# Patient Record
Sex: FEMALE | Race: WHITE | NOT HISPANIC OR LATINO
[De-identification: names, ages, dates, MRNs, and addresses within clinical notes are randomized per-mention and may not be internally consistent; named-entity substitution may affect disease eponyms.]

---

## 2017-01-05 ENCOUNTER — APPOINTMENT (OUTPATIENT)
Dept: UROLOGY | Facility: CLINIC | Age: 82
End: 2017-01-05

## 2017-01-05 DIAGNOSIS — C67.9 MALIGNANT NEOPLASM OF BLADDER, UNSPECIFIED: ICD-10-CM

## 2017-01-05 DIAGNOSIS — R35.0 FREQUENCY OF MICTURITION: ICD-10-CM

## 2017-01-07 LAB
APPEARANCE: ABNORMAL
BACTERIA: ABNORMAL
BILIRUBIN URINE: NEGATIVE
BLOOD URINE: ABNORMAL
CALCIUM OXALATE CRYSTALS: ABNORMAL
COLOR: YELLOW
GLUCOSE QUALITATIVE U: NORMAL MG/DL
HYALINE CASTS: 0 /LPF
KETONES URINE: NEGATIVE
LEUKOCYTE ESTERASE URINE: ABNORMAL
MICROSCOPIC-UA: NORMAL
NITRITE URINE: NEGATIVE
PH URINE: 5.5
PROTEIN URINE: 30 MG/DL
RED BLOOD CELLS URINE: 89 /HPF
SPECIFIC GRAVITY URINE: 1.03
SQUAMOUS EPITHELIAL CELLS: 3 /HPF
UROBILINOGEN URINE: NORMAL MG/DL
WHITE BLOOD CELLS URINE: 108 /HPF

## 2017-01-09 LAB — CORE LAB FLUID CYTOLOGY: NORMAL

## 2017-04-20 ENCOUNTER — APPOINTMENT (OUTPATIENT)
Dept: OTOLARYNGOLOGY | Facility: CLINIC | Age: 82
End: 2017-04-20

## 2017-04-20 VITALS
SYSTOLIC BLOOD PRESSURE: 130 MMHG | WEIGHT: 158 LBS | HEART RATE: 84 BPM | DIASTOLIC BLOOD PRESSURE: 77 MMHG | HEIGHT: 65 IN | BODY MASS INDEX: 26.33 KG/M2

## 2017-04-20 DIAGNOSIS — H69.83 OTHER SPECIFIED DISORDERS OF EUSTACHIAN TUBE, BILATERAL: ICD-10-CM

## 2017-04-20 DIAGNOSIS — H61.23 IMPACTED CERUMEN, BILATERAL: ICD-10-CM

## 2018-08-18 ENCOUNTER — EMERGENCY (EMERGENCY)
Facility: HOSPITAL | Age: 83
LOS: 1 days | Discharge: HOME | End: 2018-08-18
Attending: EMERGENCY MEDICINE | Admitting: EMERGENCY MEDICINE
Payer: MEDICARE

## 2018-08-18 VITALS
OXYGEN SATURATION: 100 % | RESPIRATION RATE: 20 BRPM | SYSTOLIC BLOOD PRESSURE: 139 MMHG | HEART RATE: 103 BPM | TEMPERATURE: 99 F | DIASTOLIC BLOOD PRESSURE: 84 MMHG

## 2018-08-18 DIAGNOSIS — Z90.49 ACQUIRED ABSENCE OF OTHER SPECIFIED PARTS OF DIGESTIVE TRACT: Chronic | ICD-10-CM

## 2018-08-18 DIAGNOSIS — Z96.641 PRESENCE OF RIGHT ARTIFICIAL HIP JOINT: Chronic | ICD-10-CM

## 2018-08-18 LAB
ALBUMIN SERPL ELPH-MCNC: 3.6 G/DL — SIGNIFICANT CHANGE UP (ref 3.3–5)
ALP SERPL-CCNC: 65 U/L — SIGNIFICANT CHANGE UP (ref 40–120)
ALT FLD-CCNC: 21 U/L — SIGNIFICANT CHANGE UP (ref 4–33)
AST SERPL-CCNC: 19 U/L — SIGNIFICANT CHANGE UP (ref 4–32)
BASOPHILS # BLD AUTO: 0.04 K/UL — SIGNIFICANT CHANGE UP (ref 0–0.2)
BASOPHILS NFR BLD AUTO: 0.4 % — SIGNIFICANT CHANGE UP (ref 0–2)
BILIRUB SERPL-MCNC: 0.3 MG/DL — SIGNIFICANT CHANGE UP (ref 0.2–1.2)
BUN SERPL-MCNC: 14 MG/DL — SIGNIFICANT CHANGE UP (ref 7–23)
CALCIUM SERPL-MCNC: 9.4 MG/DL — SIGNIFICANT CHANGE UP (ref 8.4–10.5)
CHLORIDE SERPL-SCNC: 103 MMOL/L — SIGNIFICANT CHANGE UP (ref 98–107)
CO2 SERPL-SCNC: 23 MMOL/L — SIGNIFICANT CHANGE UP (ref 22–31)
CREAT SERPL-MCNC: 0.88 MG/DL — SIGNIFICANT CHANGE UP (ref 0.5–1.3)
EOSINOPHIL # BLD AUTO: 0.06 K/UL — SIGNIFICANT CHANGE UP (ref 0–0.5)
EOSINOPHIL NFR BLD AUTO: 0.6 % — SIGNIFICANT CHANGE UP (ref 0–6)
GLUCOSE SERPL-MCNC: 171 MG/DL — HIGH (ref 70–99)
HCT VFR BLD CALC: 41.9 % — SIGNIFICANT CHANGE UP (ref 34.5–45)
HGB BLD-MCNC: 13.8 G/DL — SIGNIFICANT CHANGE UP (ref 11.5–15.5)
IMM GRANULOCYTES # BLD AUTO: 0.07 # — SIGNIFICANT CHANGE UP
IMM GRANULOCYTES NFR BLD AUTO: 0.8 % — SIGNIFICANT CHANGE UP (ref 0–1.5)
LYMPHOCYTES # BLD AUTO: 2.09 K/UL — SIGNIFICANT CHANGE UP (ref 1–3.3)
LYMPHOCYTES # BLD AUTO: 22.6 % — SIGNIFICANT CHANGE UP (ref 13–44)
MAGNESIUM SERPL-MCNC: 2 MG/DL — SIGNIFICANT CHANGE UP (ref 1.6–2.6)
MCHC RBC-ENTMCNC: 31.3 PG — SIGNIFICANT CHANGE UP (ref 27–34)
MCHC RBC-ENTMCNC: 32.9 % — SIGNIFICANT CHANGE UP (ref 32–36)
MCV RBC AUTO: 95 FL — SIGNIFICANT CHANGE UP (ref 80–100)
MONOCYTES # BLD AUTO: 0.73 K/UL — SIGNIFICANT CHANGE UP (ref 0–0.9)
MONOCYTES NFR BLD AUTO: 7.9 % — SIGNIFICANT CHANGE UP (ref 2–14)
NEUTROPHILS # BLD AUTO: 6.27 K/UL — SIGNIFICANT CHANGE UP (ref 1.8–7.4)
NEUTROPHILS NFR BLD AUTO: 67.7 % — SIGNIFICANT CHANGE UP (ref 43–77)
NRBC # FLD: 0 — SIGNIFICANT CHANGE UP
PHOSPHATE SERPL-MCNC: 2.9 MG/DL — SIGNIFICANT CHANGE UP (ref 2.5–4.5)
PLATELET # BLD AUTO: 330 K/UL — SIGNIFICANT CHANGE UP (ref 150–400)
PMV BLD: 9.6 FL — SIGNIFICANT CHANGE UP (ref 7–13)
POTASSIUM SERPL-MCNC: 3.8 MMOL/L — SIGNIFICANT CHANGE UP (ref 3.5–5.3)
POTASSIUM SERPL-SCNC: 3.8 MMOL/L — SIGNIFICANT CHANGE UP (ref 3.5–5.3)
PROT SERPL-MCNC: 7.2 G/DL — SIGNIFICANT CHANGE UP (ref 6–8.3)
RBC # BLD: 4.41 M/UL — SIGNIFICANT CHANGE UP (ref 3.8–5.2)
RBC # FLD: 12.5 % — SIGNIFICANT CHANGE UP (ref 10.3–14.5)
SODIUM SERPL-SCNC: 141 MMOL/L — SIGNIFICANT CHANGE UP (ref 135–145)
TROPONIN T, HIGH SENSITIVITY: 19 NG/L — SIGNIFICANT CHANGE UP (ref ?–14)
TROPONIN T, HIGH SENSITIVITY: 23 NG/L — SIGNIFICANT CHANGE UP (ref ?–14)
TSH SERPL-MCNC: 0.5 UIU/ML — SIGNIFICANT CHANGE UP (ref 0.27–4.2)
WBC # BLD: 9.26 K/UL — SIGNIFICANT CHANGE UP (ref 3.8–10.5)
WBC # FLD AUTO: 9.26 K/UL — SIGNIFICANT CHANGE UP (ref 3.8–10.5)

## 2018-08-18 PROCEDURE — 93010 ELECTROCARDIOGRAM REPORT: CPT | Mod: 59

## 2018-08-18 PROCEDURE — 71046 X-RAY EXAM CHEST 2 VIEWS: CPT | Mod: 26

## 2018-08-18 PROCEDURE — 99218: CPT

## 2018-08-18 RX ORDER — SIMVASTATIN 20 MG/1
20 TABLET, FILM COATED ORAL DAILY
Qty: 0 | Refills: 0 | Status: DISCONTINUED | OUTPATIENT
Start: 2018-08-18 | End: 2018-08-22

## 2018-08-18 NOTE — MEDICAL STUDENT ADULT H&P (EDUCATION) - NS MD HP STUD ASPLAN PLAN FT
Will observe patient in CDU on a monitored bed to assess for additional episodes of tachycardia, and likely send her for an echocardiogram for further cardiac assessment.

## 2018-08-18 NOTE — ED PROVIDER NOTE - OBJECTIVE STATEMENT
84F hx of bladder r/s, hld, c/o of palpitations 84F hx of bladder r/s, hld on simvastatin 20mg qhs, c/o of palpitations lasting approx 20min, with home oximeter and bp cuff recording rate to 180, onset this morning after breakfast, without reoccurence, was seen at  center prior to arrival and ekg sinus with normal rate. No known hx of CAD, no fam hx of CAD, never smoker, no alcohol/caffeine use, no recent illness/fevers/chills/cough/ dysuria/ abd pain/ n/v/d,       no cardiologist but  sees Dr Ricky Veloz

## 2018-08-18 NOTE — ED CDU PROVIDER INITIAL DAY NOTE - PHYSICAL EXAMINATION
Dr Bravo  pt no distress appears comfortable. nontoxic. mmm. EOMI. no facial asymmetry. normal S1-S2 No resp distress. able to speak in full and clear sentences. no wheeze, rales or stridor. soft nontender abdomen. no  rebound. no guarding. no sign of trauma. no CVAT AOx3, no focal deficits. CN 2-12 grossly intact. nl gait. no meningeal signs. no pedal edema. no calf tenderness. normal pulses bilateral feet.

## 2018-08-18 NOTE — MEDICAL STUDENT ADULT H&P (EDUCATION) - NS MD HP STUD ROS GENERAL FT
Patient feels in her usual state of health, and is in no acute distress. She denies constitutional symptoms including weight change, fever, chills, and night sweats.

## 2018-08-18 NOTE — MEDICAL STUDENT ADULT H&P (EDUCATION) - NS MD HP STUD PE CONSITUTIONAL FT
Patient is awake, alert, feeling in her usual state of health, and resting comfortably in her stretcher in no acute distress

## 2018-08-18 NOTE — MEDICAL STUDENT ADULT H&P (EDUCATION) - NS MD HP STUD ASPLAN ASSES FT
Patient is an 84 year old female with no cardiac history who presented to the ED for evaluation of possible episode of transient palpitations, found to be tachycardic in the 180's by her  on home oximeter. Patient has been chest pain free with no other symptoms all day.

## 2018-08-18 NOTE — ED CDU PROVIDER INITIAL DAY NOTE - PROGRESS NOTE DETAILS
PT no distress, nontoxic. no cp or sob.  Endorsed to BAILEY Perry BAILEY Perry: pt NAD, VSS, no acute complaints. Pt had 10 second interval of tachycardia (HR in the 130's) after returning from the bathroom - pt was asymptomatic during episode. Pt resting comfortably. Pending echo in am.

## 2018-08-18 NOTE — ED ADULT TRIAGE NOTE - CHIEF COMPLAINT QUOTE
Patient c/o of having palpitations this morning and was feeling lightheaded.  Pt states her heart rate was in the 180's pt was seen by her PMD this morning and sent in for further evaluation.  Pt in triage denies chest pain sob or feeling lightheaded.

## 2018-08-18 NOTE — ED CDU PROVIDER INITIAL DAY NOTE - ATTENDING CONTRIBUTION TO CARE
Attending Statement: I have reviewed and agree with all pertinent clinical information, including history and physical exam and agree with treatment plan of the PA, except as noted.  see HPI/PE

## 2018-08-18 NOTE — MEDICAL STUDENT ADULT H&P (EDUCATION) - NS MD HP STUD HX OF PRESENT ILLNESS FT
Patient is an 84 year old female with history of hypercholesterolemia, glaucoma, insomnia, bladder cancer in remission, and chronic sinusitis who presented to the ED accompanied by her  for evaluation of palpitations. Patient states that at approximately 7AM today she experienced acute onset of what she describes as "gurgling" in her epigastrium, directly in the sub-xiphoid region that lasted 10-15 minutes and then resolved spontaneously. Her  took her pulse at the time with an oximeter and it read 180 bpm. Patient went to a local urgent care center and an EKG with basic labs were both normal, but she was told to come to the ED for further cardiac workup. Patient is an excellent historian and adamantly denies chest pain, dyspnea, nausea, vomiting, headache, weakness, numbness, paresthesias, abdominal pain, changes in bowel/urinary habits, syncope, vision changes, dysarthria, and changes in mental status. Of note, patient states she and her  were seen by their PMD on Monday for URI-like symptoms and she completed a Z-haris.

## 2018-08-18 NOTE — ED CDU PROVIDER INITIAL DAY NOTE - OBJECTIVE STATEMENT
Dr Bravo  85yo F hx of HLD, hx of bladder ca presented to ED an episode of palpitations at 7am. Pt was in bed, felt an epigastric "rumbling" measured . Denies feeling dizzy/lightheaded/chest pain/nausea/vomit/diaphorectic during the event. Lasted approximately 10 min. Went to an , had ekg/labs referred to ED. Since this am pt "feels fine" no palpitations. no chest pain. no recent illness. no urinary complaints. nonsmoker. no travel hx

## 2018-08-18 NOTE — ED PROVIDER NOTE - MEDICAL DECISION MAKING DETAILS
84F no significant cardiac hx- with paroxysmal tachycardia, with left axis deviation here, sinus and normal rate without ischemic changes, will eval tsh/ cmp/ cbc/ trops, and likely obs.

## 2018-08-18 NOTE — MEDICAL STUDENT ADULT H&P (EDUCATION) - NS MD HP STUD RESULTS LAB FT
H/H - 13.8/41.9  WBC - 9.26  Platelets - 330  Troponin @13:00 - 23  Troponin @15:15 - 19  Na - 141  K - 3.8  Cl - 103  CO2 - 23  BUN - 14  Cr - 0.88  Glucose - 171

## 2018-08-18 NOTE — ED ADULT NURSE NOTE - NSIMPLEMENTINTERV_GEN_ALL_ED
Implemented All Universal Safety Interventions:  [place] to call system. Call bell, personal items and telephone within reach. Instruct patient to call for assistance. Room bathroom lighting operational. Non-slip footwear when patient is off stretcher. Physically safe environment: no spills, clutter or unnecessary equipment. Stretcher in lowest position, wheels locked, appropriate side rails in place.

## 2018-08-18 NOTE — MEDICAL STUDENT ADULT H&P (EDUCATION) - NS MD HP STUD ROS ENMT FT
No changes in hearing. No rhinorrhea. No oral lesions. No sore throat. (+) Occasional cough that patient attributes to post-nasal drip from sinus congestion

## 2018-08-18 NOTE — ED ADULT NURSE NOTE - OBJECTIVE STATEMENT
Patient to room 18 for evaluation of palpitations. Patient evaluated by MD. IVL placed to right AC 20 gauge and labs drawn and sent. Pt is sinus tach @94bpm on the cardiac monitor. Pt is comfortable and waiting for results, further tests, results and disposition.   ISAURA Adams

## 2018-08-18 NOTE — MEDICAL STUDENT ADULT H&P (EDUCATION) - NS MD HP STUD PE VITALS FT
Temperature  Temp (F): 98.9 Degrees F  Temp (C) Temp (C): 37.2 Degrees C  Temp site Temp Site: oral    Heart Rate  Heart Rate Heart Rate (beats/min): 90 /min  Heart Rate Method: cardiac monitor    Noninvasive Blood Pressure  BP Systolic Systolic: 137 mm Hg  BP Diastolic Diastolic (mm Hg): 80 mm Hg    Respiratory/Pulse Oximetry  Respiration Rate (breaths/min) Respiration Rate (breaths/min): 18 /min  SpO2 (%) SpO2 (%): 94 %  O2 delivery Patient On: room air

## 2018-08-19 VITALS
RESPIRATION RATE: 16 BRPM | TEMPERATURE: 98 F | DIASTOLIC BLOOD PRESSURE: 83 MMHG | SYSTOLIC BLOOD PRESSURE: 149 MMHG | HEART RATE: 81 BPM | OXYGEN SATURATION: 95 %

## 2018-08-19 LAB — HBA1C BLD-MCNC: 5.5 % — SIGNIFICANT CHANGE UP (ref 4–5.6)

## 2018-08-19 PROCEDURE — 93306 TTE W/DOPPLER COMPLETE: CPT | Mod: 26

## 2018-08-19 PROCEDURE — 99217: CPT

## 2018-08-19 NOTE — ED CDU PROVIDER DISPOSITION NOTE - CLINICAL COURSE
84F c/o palpitations, epigastric pressure, noted  PTA. In Urgentcare had EKG NSR and sent to ED. No sx in ED but noted elev.  while ambulating. No hx of heart murmur or CAD. No sx overnight, normal labs, trop, EKG. S/p ECHO in CDU with sl. MR, will dc home with FU for PMD and Cardiology (Dr. REGINA Veloz). Pt. recently tx for H.pylori.

## 2018-08-19 NOTE — ED CDU PROVIDER SUBSEQUENT DAY NOTE - MEDICAL DECISION MAKING DETAILS
84 year old female with a PMHx of HLD pw epigastric palpitations and recorded HR of ~180 yesterday at rest.  Plan: echo

## 2018-08-19 NOTE — ED CDU PROVIDER SUBSEQUENT DAY NOTE - PROGRESS NOTE DETAILS
pt feeling well, no events on tele. ambulated without difficulty. Echo report given to patient to follow up with PMD and cardiology. Wants to go, all results given to patient, with return precautions given.

## 2018-08-19 NOTE — ED CDU PROVIDER SUBSEQUENT DAY NOTE - HISTORY
84 year old female with a PMHx of HLD pw epigastric palpitations and recorded HR of ~180 yesterday at rest. Pt was seen at an urgent care center had EKG/labs performed which were WNL and told to f.u in the ED for further evaluation. Pt has been asymptomatic. Had a 10 second episode of tachycardia in the 130's after going to bathroom. Pending echo.

## 2018-10-19 ENCOUNTER — NON-APPOINTMENT (OUTPATIENT)
Age: 83
End: 2018-10-19

## 2018-10-19 ENCOUNTER — APPOINTMENT (OUTPATIENT)
Dept: ELECTROPHYSIOLOGY | Facility: CLINIC | Age: 83
End: 2018-10-19
Payer: MEDICARE

## 2018-10-19 VITALS
WEIGHT: 153 LBS | SYSTOLIC BLOOD PRESSURE: 146 MMHG | DIASTOLIC BLOOD PRESSURE: 76 MMHG | HEIGHT: 65 IN | BODY MASS INDEX: 25.49 KG/M2 | OXYGEN SATURATION: 95 % | HEART RATE: 76 BPM

## 2018-10-19 DIAGNOSIS — R00.2 PALPITATIONS: ICD-10-CM

## 2018-10-19 PROCEDURE — 93000 ELECTROCARDIOGRAM COMPLETE: CPT

## 2018-10-19 PROCEDURE — 99204 OFFICE O/P NEW MOD 45 MIN: CPT

## 2018-10-19 RX ORDER — METOPROLOL SUCCINATE 25 MG/1
25 TABLET, EXTENDED RELEASE ORAL
Qty: 1 | Refills: 2 | Status: ACTIVE | COMMUNITY
Start: 2018-10-19

## 2019-08-20 ENCOUNTER — OUTPATIENT (OUTPATIENT)
Dept: OUTPATIENT SERVICES | Facility: HOSPITAL | Age: 84
LOS: 1 days | End: 2019-08-20
Payer: MEDICARE

## 2019-08-20 ENCOUNTER — APPOINTMENT (OUTPATIENT)
Dept: RADIOLOGY | Facility: IMAGING CENTER | Age: 84
End: 2019-08-20
Payer: MEDICARE

## 2019-08-20 DIAGNOSIS — Z96.641 PRESENCE OF RIGHT ARTIFICIAL HIP JOINT: Chronic | ICD-10-CM

## 2019-08-20 DIAGNOSIS — Z00.8 ENCOUNTER FOR OTHER GENERAL EXAMINATION: ICD-10-CM

## 2019-08-20 DIAGNOSIS — Z90.49 ACQUIRED ABSENCE OF OTHER SPECIFIED PARTS OF DIGESTIVE TRACT: Chronic | ICD-10-CM

## 2019-08-20 PROCEDURE — 73502 X-RAY EXAM HIP UNI 2-3 VIEWS: CPT

## 2019-08-20 PROCEDURE — 73502 X-RAY EXAM HIP UNI 2-3 VIEWS: CPT | Mod: 26,RT

## 2020-11-17 ENCOUNTER — APPOINTMENT (OUTPATIENT)
Dept: GASTROENTEROLOGY | Facility: CLINIC | Age: 85
End: 2020-11-17
Payer: MEDICARE

## 2020-11-17 ENCOUNTER — NON-APPOINTMENT (OUTPATIENT)
Age: 85
End: 2020-11-17

## 2020-11-17 PROCEDURE — 99443: CPT

## 2020-11-24 DIAGNOSIS — Z01.812 ENCOUNTER FOR PREPROCEDURAL LABORATORY EXAMINATION: ICD-10-CM

## 2020-11-24 DIAGNOSIS — K80.50 CALCULUS OF BILE DUCT W/OUT CHOLANGITIS OR CHOLECYSTITIS W/OUT OBSTRUCTION: ICD-10-CM

## 2020-12-14 ENCOUNTER — APPOINTMENT (OUTPATIENT)
Dept: MRI IMAGING | Facility: CLINIC | Age: 85
End: 2020-12-14

## 2021-01-12 ENCOUNTER — OUTPATIENT (OUTPATIENT)
Dept: OUTPATIENT SERVICES | Facility: HOSPITAL | Age: 86
LOS: 1 days | End: 2021-01-12
Payer: MEDICARE

## 2021-01-12 ENCOUNTER — APPOINTMENT (OUTPATIENT)
Dept: MRI IMAGING | Facility: CLINIC | Age: 86
End: 2021-01-12
Payer: MEDICARE

## 2021-01-12 DIAGNOSIS — Z90.49 ACQUIRED ABSENCE OF OTHER SPECIFIED PARTS OF DIGESTIVE TRACT: Chronic | ICD-10-CM

## 2021-01-12 DIAGNOSIS — Z00.8 ENCOUNTER FOR OTHER GENERAL EXAMINATION: ICD-10-CM

## 2021-01-12 DIAGNOSIS — K80.50 CALCULUS OF BILE DUCT WITHOUT CHOLANGITIS OR CHOLECYSTITIS WITHOUT OBSTRUCTION: ICD-10-CM

## 2021-01-12 DIAGNOSIS — Z96.641 PRESENCE OF RIGHT ARTIFICIAL HIP JOINT: Chronic | ICD-10-CM

## 2021-01-12 PROCEDURE — 74183 MRI ABD W/O CNTR FLWD CNTR: CPT | Mod: 26

## 2021-01-12 PROCEDURE — A9585: CPT

## 2021-01-12 PROCEDURE — 74183 MRI ABD W/O CNTR FLWD CNTR: CPT

## 2021-02-11 ENCOUNTER — OUTPATIENT (OUTPATIENT)
Dept: OUTPATIENT SERVICES | Facility: HOSPITAL | Age: 86
LOS: 1 days | End: 2021-02-11
Payer: MEDICARE

## 2021-02-11 VITALS
TEMPERATURE: 100 F | OXYGEN SATURATION: 96 % | DIASTOLIC BLOOD PRESSURE: 69 MMHG | RESPIRATION RATE: 20 BRPM | SYSTOLIC BLOOD PRESSURE: 111 MMHG | HEART RATE: 101 BPM | HEIGHT: 65 IN | WEIGHT: 141.1 LBS

## 2021-02-11 DIAGNOSIS — Z96.641 PRESENCE OF RIGHT ARTIFICIAL HIP JOINT: Chronic | ICD-10-CM

## 2021-02-11 DIAGNOSIS — Z90.49 ACQUIRED ABSENCE OF OTHER SPECIFIED PARTS OF DIGESTIVE TRACT: Chronic | ICD-10-CM

## 2021-02-11 DIAGNOSIS — Z01.818 ENCOUNTER FOR OTHER PREPROCEDURAL EXAMINATION: ICD-10-CM

## 2021-02-11 DIAGNOSIS — K80.20 CALCULUS OF GALLBLADDER WITHOUT CHOLECYSTITIS WITHOUT OBSTRUCTION: ICD-10-CM

## 2021-02-11 DIAGNOSIS — N39.0 URINARY TRACT INFECTION, SITE NOT SPECIFIED: ICD-10-CM

## 2021-02-11 DIAGNOSIS — K80.50 CALCULUS OF BILE DUCT WITHOUT CHOLANGITIS OR CHOLECYSTITIS WITHOUT OBSTRUCTION: ICD-10-CM

## 2021-02-11 DIAGNOSIS — Z87.898 PERSONAL HISTORY OF OTHER SPECIFIED CONDITIONS: ICD-10-CM

## 2021-02-11 LAB
ALBUMIN SERPL ELPH-MCNC: 3.8 G/DL — SIGNIFICANT CHANGE UP (ref 3.3–5)
ALP SERPL-CCNC: 680 U/L — HIGH (ref 40–120)
ALT FLD-CCNC: 95 U/L — HIGH (ref 10–45)
ANION GAP SERPL CALC-SCNC: 16 MMOL/L — SIGNIFICANT CHANGE UP (ref 5–17)
AST SERPL-CCNC: 79 U/L — HIGH (ref 10–40)
BILIRUB SERPL-MCNC: 1 MG/DL — SIGNIFICANT CHANGE UP (ref 0.2–1.2)
BUN SERPL-MCNC: 15 MG/DL — SIGNIFICANT CHANGE UP (ref 7–23)
CALCIUM SERPL-MCNC: 9.4 MG/DL — SIGNIFICANT CHANGE UP (ref 8.4–10.5)
CHLORIDE SERPL-SCNC: 102 MMOL/L — SIGNIFICANT CHANGE UP (ref 96–108)
CO2 SERPL-SCNC: 21 MMOL/L — LOW (ref 22–31)
CREAT SERPL-MCNC: 0.85 MG/DL — SIGNIFICANT CHANGE UP (ref 0.5–1.3)
GLUCOSE SERPL-MCNC: 102 MG/DL — HIGH (ref 70–99)
HCT VFR BLD CALC: 43.5 % — SIGNIFICANT CHANGE UP (ref 34.5–45)
HGB BLD-MCNC: 13.9 G/DL — SIGNIFICANT CHANGE UP (ref 11.5–15.5)
MCHC RBC-ENTMCNC: 30.3 PG — SIGNIFICANT CHANGE UP (ref 27–34)
MCHC RBC-ENTMCNC: 32 GM/DL — SIGNIFICANT CHANGE UP (ref 32–36)
MCV RBC AUTO: 94.8 FL — SIGNIFICANT CHANGE UP (ref 80–100)
NRBC # BLD: 0 /100 WBCS — SIGNIFICANT CHANGE UP (ref 0–0)
PLATELET # BLD AUTO: 321 K/UL — SIGNIFICANT CHANGE UP (ref 150–400)
POTASSIUM SERPL-MCNC: 4.1 MMOL/L — SIGNIFICANT CHANGE UP (ref 3.5–5.3)
POTASSIUM SERPL-SCNC: 4.1 MMOL/L — SIGNIFICANT CHANGE UP (ref 3.5–5.3)
PROT SERPL-MCNC: 7.1 G/DL — SIGNIFICANT CHANGE UP (ref 6–8.3)
RBC # BLD: 4.59 M/UL — SIGNIFICANT CHANGE UP (ref 3.8–5.2)
RBC # FLD: 14 % — SIGNIFICANT CHANGE UP (ref 10.3–14.5)
SODIUM SERPL-SCNC: 139 MMOL/L — SIGNIFICANT CHANGE UP (ref 135–145)
WBC # BLD: 7.67 K/UL — SIGNIFICANT CHANGE UP (ref 3.8–10.5)
WBC # FLD AUTO: 7.67 K/UL — SIGNIFICANT CHANGE UP (ref 3.8–10.5)

## 2021-02-11 PROCEDURE — 80053 COMPREHEN METABOLIC PANEL: CPT

## 2021-02-11 PROCEDURE — G0463: CPT

## 2021-02-11 PROCEDURE — 93010 ELECTROCARDIOGRAM REPORT: CPT

## 2021-02-11 PROCEDURE — 85027 COMPLETE CBC AUTOMATED: CPT

## 2021-02-11 PROCEDURE — 93005 ELECTROCARDIOGRAM TRACING: CPT

## 2021-02-11 NOTE — H&P PST ADULT - NSICDXPASTMEDICALHX_GEN_ALL_CORE_FT
PAST MEDICAL HISTORY:  Hyperlipemia     Insomnia     UTI (urinary tract infection)      PAST MEDICAL HISTORY:  Calculus of gallbladder     History of palpitations     Hyperlipemia     Hypertension     Insomnia     UTI (urinary tract infection) to start on Bactrim DS 2/11/2021 night

## 2021-02-11 NOTE — H&P PST ADULT - NSANTHOSAYNRD_GEN_A_CORE
No. [facility] screening performed.  STOP BANG Legend: 0-2 = LOW Risk; 3-4 = INTERMEDIATE Risk; 5-8 = HIGH Risk

## 2021-02-11 NOTE — H&P PST ADULT - HISTORY OF PRESENT ILLNESS
87 year old female with h/o palpitations required a trip to the ED (8/2018, on metoprolol, last echo 2018), HTN, hyperlipidemia, B12 deficiency, bladder cancer (many years ago), frequent UTI (currently been treated with Bactrim DS, which patient will start on 2/11/2021 night), presents to preadmission testing today for upcoming scheduled ERCP 2/16/2021.      Covid-19 swab on 2/13/2021 [facility] in [facility]

## 2021-02-11 NOTE — H&P PST ADULT - NSICDXPROBLEM_GEN_ALL_CORE_FT
PROBLEM DIAGNOSES  Problem: Calculus of gallbladder  Assessment and Plan: scheduled for ERCP  pre-procedure instruction given to both patient and her son Garrett  Advised to maintain social distancing, good handwashing and mask wearing  Patient to scheduled apt with pcp for evaluation prior to procedure on 2/16/2021    Problem: UTI (urinary tract infection)  Assessment and Plan: Instructed to continue antibiotic as prescribed     Problem: History of palpitations  Assessment and Plan: Instructed to continue medication as prescribed   twelve lead ekg obtain at pst (sinus rhythm with sinus arrhythmia

## 2021-02-11 NOTE — H&P PST ADULT - REASON FOR ADMISSION
"I had gallbladder surgery long time ago and the doctor didn't remove everything, had abdominal pain."

## 2021-02-13 ENCOUNTER — APPOINTMENT (OUTPATIENT)
Dept: DISASTER EMERGENCY | Facility: CLINIC | Age: 86
End: 2021-02-13

## 2021-02-14 NOTE — PRE PROCEDURE NOTE - PRE PROCEDURE EVALUATION
Attending Physician: Socorro                            Procedure: ERCP    Indication for Procedure: CBD stone  ________________________________________________________  PAST MEDICAL & SURGICAL HISTORY:  Calculus of gallbladder    History of palpitations    Hypertension    UTI (urinary tract infection)  to start on Bactrim DS 2/11/2021 night    Insomnia    Hyperlipemia    S/P cholecystectomy  2012    S/P hip replacement, right  2011      ALLERGIES:  amoxicillin (Rash)    HOME MEDICATIONS:  ALPRAZolam 0.25 mg oral tablet: 1 tab(s) orally 3 times a day, PRN  Bactrim  mg-160 mg oral tablet: 0.5 tab(s) orally once a day   metoprolol tartrate 25 mg oral tablet: 0.5 tablet orally twice a day   simvastatin 10 mg oral tablet: 1 tab(s) orally once a day (at bedtime)  Vitamin B12 1000 mcg/mL injectable solution: once a month     AICD/PPM: [ ] yes   [ ] no    PERTINENT LAB DATA:                      PHYSICAL EXAMINATION:    T(C): --  HR: --  BP: --  RR: --  SpO2: --    Constitutional: NAD  HEENT: PERRLA, EOMI,    Neck:  No JVD  Respiratory: CTAB/L  Cardiovascular: S1 and S2  Gastrointestinal: BS+, soft, NT/ND  Extremities: No peripheral edema  Neurological: A/O x 3, no focal deficits  Psychiatric: Normal mood, normal affect  Skin: No rashes    ASA Class: I [ ]  II [ ]  III [ ]  IV [ ]    COMMENTS:    The patient is a suitable candidate for the planned procedure unless box checked [ ]  No, explain:

## 2021-02-15 LAB — SARS-COV-2 N GENE NPH QL NAA+PROBE: NOT DETECTED

## 2021-02-16 ENCOUNTER — OUTPATIENT (OUTPATIENT)
Dept: OUTPATIENT SERVICES | Facility: HOSPITAL | Age: 86
LOS: 1 days | End: 2021-02-16
Payer: MEDICARE

## 2021-02-16 ENCOUNTER — APPOINTMENT (OUTPATIENT)
Dept: GASTROENTEROLOGY | Facility: HOSPITAL | Age: 86
End: 2021-02-16

## 2021-02-16 VITALS
SYSTOLIC BLOOD PRESSURE: 122 MMHG | HEART RATE: 80 BPM | RESPIRATION RATE: 18 BRPM | OXYGEN SATURATION: 96 % | DIASTOLIC BLOOD PRESSURE: 70 MMHG

## 2021-02-16 VITALS
OXYGEN SATURATION: 99 % | WEIGHT: 143.96 LBS | TEMPERATURE: 98 F | RESPIRATION RATE: 15 BRPM | DIASTOLIC BLOOD PRESSURE: 67 MMHG | HEART RATE: 103 BPM | SYSTOLIC BLOOD PRESSURE: 121 MMHG | HEIGHT: 65 IN

## 2021-02-16 DIAGNOSIS — Z90.49 ACQUIRED ABSENCE OF OTHER SPECIFIED PARTS OF DIGESTIVE TRACT: Chronic | ICD-10-CM

## 2021-02-16 DIAGNOSIS — K50.80 CROHN'S DISEASE OF BOTH SMALL AND LARGE INTESTINE WITHOUT COMPLICATIONS: ICD-10-CM

## 2021-02-16 DIAGNOSIS — Z96.641 PRESENCE OF RIGHT ARTIFICIAL HIP JOINT: Chronic | ICD-10-CM

## 2021-02-16 PROCEDURE — 74330 X-RAY BILE/PANC ENDOSCOPY: CPT

## 2021-02-16 PROCEDURE — 43274 ERCP DUCT STENT PLACEMENT: CPT

## 2021-02-16 PROCEDURE — 43274 ERCP DUCT STENT PLACEMENT: CPT | Mod: GC

## 2021-02-16 PROCEDURE — 74328 X-RAY BILE DUCT ENDOSCOPY: CPT | Mod: 26,GC

## 2021-02-16 PROCEDURE — 43265 ERCP LITHOTRIPSY CALCULI: CPT | Mod: GC

## 2021-02-16 PROCEDURE — C2617: CPT

## 2021-02-16 PROCEDURE — C1887: CPT

## 2021-02-16 PROCEDURE — 43265 ERCP LITHOTRIPSY CALCULI: CPT

## 2021-02-16 PROCEDURE — C1769: CPT

## 2021-02-16 RX ORDER — INDOMETHACIN 50 MG
100 CAPSULE ORAL ONCE
Refills: 0 | Status: COMPLETED | OUTPATIENT
Start: 2021-02-16 | End: 2021-02-16

## 2021-02-16 RX ORDER — SODIUM CHLORIDE 9 MG/ML
500 INJECTION, SOLUTION INTRAVENOUS
Refills: 0 | Status: COMPLETED | OUTPATIENT
Start: 2021-02-16 | End: 2021-02-16

## 2021-02-16 RX ADMIN — SODIUM CHLORIDE 30 MILLILITER(S): 9 INJECTION, SOLUTION INTRAVENOUS at 09:47

## 2021-02-16 RX ADMIN — Medication 100 MILLIGRAM(S): at 10:12

## 2021-02-16 NOTE — PRE-ANESTHESIA EVALUATION ADULT - NSANTHADDINFOFT_GEN_ALL_CORE
Patient aware that she could lose her loose tooth. Will use a tooth guard to protect the tooth during the entire procedure.

## 2021-02-16 NOTE — ASU PATIENT PROFILE, ADULT - PMH
Calculus of gallbladder    History of palpitations    Hyperlipemia    Hypertension    Insomnia    UTI (urinary tract infection)  to start on Bactrim DS 2/11/2021 night

## 2021-02-24 DIAGNOSIS — Z46.89 ENCOUNTER FOR FITTING AND ADJUSTMENT OF OTHER SPECIFIED DEVICES: ICD-10-CM

## 2021-02-27 PROBLEM — I10 ESSENTIAL (PRIMARY) HYPERTENSION: Chronic | Status: ACTIVE | Noted: 2021-02-11

## 2021-02-27 PROBLEM — Z87.898 PERSONAL HISTORY OF OTHER SPECIFIED CONDITIONS: Chronic | Status: ACTIVE | Noted: 2021-02-11

## 2021-02-27 PROBLEM — K80.20 CALCULUS OF GALLBLADDER WITHOUT CHOLECYSTITIS WITHOUT OBSTRUCTION: Chronic | Status: ACTIVE | Noted: 2021-02-11

## 2021-04-12 NOTE — ED ADULT NURSE NOTE - NSFALLRSKOUTCOME_ED_ALL_ED
----- Message from Ayla Hammertigistirene sent at 4/12/2021  7:09 AM EDT -----  Subject: Refill Request    QUESTIONS  Name of Medication? lisinopril-hydroCHLOROthiazide (PRINZIDE;ZESTORETIC)   20-25 MG per tablet  Patient-reported dosage and instructions? Take 1 tablet by mouth daily  How many days do you have left? 0  Preferred Pharmacy? [id number] phone number (if available)? [phone]  Additional Information for Provider? new delivery mail out service  ---------------------------------------------------------------------------  --------------  [address]  What is the best way for the office to contact you? OK to leave message on   voicemail  Preferred Call Back Phone Number?  [phone] Universal Safety Interventions

## 2021-05-11 ENCOUNTER — OUTPATIENT (OUTPATIENT)
Dept: OUTPATIENT SERVICES | Facility: HOSPITAL | Age: 86
LOS: 1 days | End: 2021-05-11
Payer: MEDICARE

## 2021-05-11 VITALS
OXYGEN SATURATION: 97 % | SYSTOLIC BLOOD PRESSURE: 127 MMHG | WEIGHT: 149.03 LBS | HEIGHT: 65 IN | TEMPERATURE: 98 F | RESPIRATION RATE: 20 BRPM | HEART RATE: 91 BPM | DIASTOLIC BLOOD PRESSURE: 83 MMHG

## 2021-05-11 DIAGNOSIS — Z01.818 ENCOUNTER FOR OTHER PREPROCEDURAL EXAMINATION: ICD-10-CM

## 2021-05-11 DIAGNOSIS — K83.8 OTHER SPECIFIED DISEASES OF BILIARY TRACT: ICD-10-CM

## 2021-05-11 DIAGNOSIS — Z98.890 OTHER SPECIFIED POSTPROCEDURAL STATES: Chronic | ICD-10-CM

## 2021-05-11 DIAGNOSIS — I10 ESSENTIAL (PRIMARY) HYPERTENSION: ICD-10-CM

## 2021-05-11 DIAGNOSIS — Z90.49 ACQUIRED ABSENCE OF OTHER SPECIFIED PARTS OF DIGESTIVE TRACT: Chronic | ICD-10-CM

## 2021-05-11 DIAGNOSIS — Z46.89 ENCOUNTER FOR FITTING AND ADJUSTMENT OF OTHER SPECIFIED DEVICES: ICD-10-CM

## 2021-05-11 DIAGNOSIS — Z96.641 PRESENCE OF RIGHT ARTIFICIAL HIP JOINT: Chronic | ICD-10-CM

## 2021-05-11 LAB
ALBUMIN SERPL ELPH-MCNC: 4 G/DL — SIGNIFICANT CHANGE UP (ref 3.3–5)
ALP SERPL-CCNC: 76 U/L — SIGNIFICANT CHANGE UP (ref 40–120)
ALT FLD-CCNC: 12 U/L — SIGNIFICANT CHANGE UP (ref 10–45)
ANION GAP SERPL CALC-SCNC: 15 MMOL/L — SIGNIFICANT CHANGE UP (ref 5–17)
AST SERPL-CCNC: 15 U/L — SIGNIFICANT CHANGE UP (ref 10–40)
BILIRUB SERPL-MCNC: 0.4 MG/DL — SIGNIFICANT CHANGE UP (ref 0.2–1.2)
BUN SERPL-MCNC: 15 MG/DL — SIGNIFICANT CHANGE UP (ref 7–23)
CALCIUM SERPL-MCNC: 9.1 MG/DL — SIGNIFICANT CHANGE UP (ref 8.4–10.5)
CHLORIDE SERPL-SCNC: 106 MMOL/L — SIGNIFICANT CHANGE UP (ref 96–108)
CO2 SERPL-SCNC: 21 MMOL/L — LOW (ref 22–31)
CREAT SERPL-MCNC: 0.89 MG/DL — SIGNIFICANT CHANGE UP (ref 0.5–1.3)
GLUCOSE SERPL-MCNC: 91 MG/DL — SIGNIFICANT CHANGE UP (ref 70–99)
HCT VFR BLD CALC: 43.4 % — SIGNIFICANT CHANGE UP (ref 34.5–45)
HGB BLD-MCNC: 14 G/DL — SIGNIFICANT CHANGE UP (ref 11.5–15.5)
MCHC RBC-ENTMCNC: 29.9 PG — SIGNIFICANT CHANGE UP (ref 27–34)
MCHC RBC-ENTMCNC: 32.3 GM/DL — SIGNIFICANT CHANGE UP (ref 32–36)
MCV RBC AUTO: 92.5 FL — SIGNIFICANT CHANGE UP (ref 80–100)
NRBC # BLD: 0 /100 WBCS — SIGNIFICANT CHANGE UP (ref 0–0)
PLATELET # BLD AUTO: 239 K/UL — SIGNIFICANT CHANGE UP (ref 150–400)
POTASSIUM SERPL-MCNC: 4 MMOL/L — SIGNIFICANT CHANGE UP (ref 3.5–5.3)
POTASSIUM SERPL-SCNC: 4 MMOL/L — SIGNIFICANT CHANGE UP (ref 3.5–5.3)
PROT SERPL-MCNC: 6.8 G/DL — SIGNIFICANT CHANGE UP (ref 6–8.3)
RBC # BLD: 4.69 M/UL — SIGNIFICANT CHANGE UP (ref 3.8–5.2)
RBC # FLD: 13.9 % — SIGNIFICANT CHANGE UP (ref 10.3–14.5)
SODIUM SERPL-SCNC: 142 MMOL/L — SIGNIFICANT CHANGE UP (ref 135–145)
WBC # BLD: 6.49 K/UL — SIGNIFICANT CHANGE UP (ref 3.8–10.5)
WBC # FLD AUTO: 6.49 K/UL — SIGNIFICANT CHANGE UP (ref 3.8–10.5)

## 2021-05-11 PROCEDURE — 80053 COMPREHEN METABOLIC PANEL: CPT

## 2021-05-11 PROCEDURE — 85027 COMPLETE CBC AUTOMATED: CPT

## 2021-05-11 PROCEDURE — G0463: CPT

## 2021-05-11 RX ORDER — AZTREONAM 2 G
1 VIAL (EA) INJECTION
Qty: 0 | Refills: 0 | DISCHARGE

## 2021-05-11 RX ORDER — SIMVASTATIN 20 MG/1
1 TABLET, FILM COATED ORAL
Qty: 0 | Refills: 0 | DISCHARGE

## 2021-05-11 RX ORDER — PREGABALIN 225 MG/1
0 CAPSULE ORAL
Qty: 0 | Refills: 0 | DISCHARGE

## 2021-05-11 NOTE — H&P PST ADULT - NSICDXPASTMEDICALHX_GEN_ALL_CORE_FT
PAST MEDICAL HISTORY:  Calculus of gallbladder     History of palpitations     Hyperlipemia not on meds    Hypertension     Insomnia     UTI (urinary tract infection) recurrent

## 2021-05-11 NOTE — H&P PST ADULT - HISTORY OF PRESENT ILLNESS
87 year old female with h/o palpitations required a trip to the ED (8/2018, on metoprolol, last echo 2018), HTN, hyperlipidemia, B12 deficiency, bladder cancer (many years ago), frequent UTI , with dilated CBD - s/p  ERCP 2/16/2021 s/p needle knife sphincterotomy, mechanical lithotripsy, balloon extraction, and  double pigtail stent placed for continued drainage and stone fragmentation. Now coming in for ERCP on 5/18/2021.       Denies Recent travel, Exposure or Covid symptoms  Covid vaccine - 2 doses   Covid test - 5/15/2021 87 year old female with h/o palpitations,  HTN, hyperlipidemia, B12 deficiency, bladder cancer (many years ago), frequent UTI , with dilated CBD - s/p  ERCP 2/16/2021 s/p needle knife sphincterotomy, mechanical lithotripsy, balloon extraction, and  double pigtail stent placed for continued drainage and stone fragmentation. Now coming in for ERCP on 5/18/2021.       Denies Recent travel, Exposure or Covid symptoms  Covid vaccine - 2 doses   Covid test - 5/15/2021

## 2021-05-11 NOTE — H&P PST ADULT - NSICDXPROBLEM_GEN_ALL_CORE_FT
PROBLEM DIAGNOSES  Problem: Common bile duct dilation  Assessment and Plan: ERCP    Problem: HTN (hypertension)  Assessment and Plan: Continue on antihypertensive medication

## 2021-05-11 NOTE — H&P PST ADULT - NSICDXPASTSURGICALHX_GEN_ALL_CORE_FT
PAST SURGICAL HISTORY:  S/P cholecystectomy 2012    S/P ERCP 2/2021    S/P hip replacement, right 2011

## 2021-05-14 NOTE — PRE PROCEDURE NOTE - PRE PROCEDURE EVALUATION
Attending Physician:   Socorro                         Procedure: ERCP    Indication for Procedure: stone and stent removal  ________________________________________________________  PAST MEDICAL & SURGICAL HISTORY:  Hyperlipemia  not on meds    Insomnia    UTI (urinary tract infection)  recurrent    Hypertension    History of palpitations    Calculus of gallbladder    S/P hip replacement, right  2011    S/P cholecystectomy  2012    S/P ERCP  2/2021    S/P cystoscopy  TURBT X 1      ALLERGIES:  amoxicillin (Rash)    HOME MEDICATIONS:  ALPRAZolam 0.25 mg oral tablet: 1 tab(s) orally 3 times a day, As Needed - for anxiety  metoprolol tartrate 25 mg oral tablet: 0.5 tablet orally twice a day   Vitamin C 1000 mg oral tablet: 1 tab(s) orally once a day    AICD/PPM: [ ] yes   [ ] no    PERTINENT LAB DATA:                      PHYSICAL EXAMINATION:    T(C): --  HR: --  BP: --  RR: --  SpO2: --    Constitutional: NAD  HEENT: PERRLA, EOMI,    Neck:  No JVD  Respiratory: CTAB/L  Cardiovascular: S1 and S2  Gastrointestinal: BS+, soft, NT/ND  Extremities: No peripheral edema  Neurological: A/O x 3, no focal deficits  Psychiatric: Normal mood, normal affect  Skin: No rashes    ASA Class: I [ ]  II [ ]  III [ ]  IV [ ]    COMMENTS:    The patient is a suitable candidate for the planned procedure unless box checked [ ]  No, explain:

## 2021-05-15 ENCOUNTER — APPOINTMENT (OUTPATIENT)
Dept: DISASTER EMERGENCY | Facility: CLINIC | Age: 86
End: 2021-05-15

## 2021-05-16 LAB — SARS-COV-2 N GENE NPH QL NAA+PROBE: NOT DETECTED

## 2021-05-18 ENCOUNTER — OUTPATIENT (OUTPATIENT)
Dept: OUTPATIENT SERVICES | Facility: HOSPITAL | Age: 86
LOS: 1 days | End: 2021-05-18
Payer: MEDICARE

## 2021-05-18 ENCOUNTER — APPOINTMENT (OUTPATIENT)
Dept: GASTROENTEROLOGY | Facility: HOSPITAL | Age: 86
End: 2021-05-18

## 2021-05-18 VITALS
RESPIRATION RATE: 17 BRPM | OXYGEN SATURATION: 96 % | DIASTOLIC BLOOD PRESSURE: 75 MMHG | HEART RATE: 73 BPM | SYSTOLIC BLOOD PRESSURE: 129 MMHG

## 2021-05-18 VITALS
DIASTOLIC BLOOD PRESSURE: 78 MMHG | TEMPERATURE: 98 F | WEIGHT: 149.91 LBS | HEART RATE: 80 BPM | RESPIRATION RATE: 19 BRPM | HEIGHT: 65 IN | OXYGEN SATURATION: 98 % | SYSTOLIC BLOOD PRESSURE: 136 MMHG

## 2021-05-18 DIAGNOSIS — Z46.89 ENCOUNTER FOR FITTING AND ADJUSTMENT OF OTHER SPECIFIED DEVICES: ICD-10-CM

## 2021-05-18 DIAGNOSIS — Z98.890 OTHER SPECIFIED POSTPROCEDURAL STATES: Chronic | ICD-10-CM

## 2021-05-18 DIAGNOSIS — Z90.49 ACQUIRED ABSENCE OF OTHER SPECIFIED PARTS OF DIGESTIVE TRACT: Chronic | ICD-10-CM

## 2021-05-18 DIAGNOSIS — Z96.641 PRESENCE OF RIGHT ARTIFICIAL HIP JOINT: Chronic | ICD-10-CM

## 2021-05-18 PROCEDURE — 74328 X-RAY BILE DUCT ENDOSCOPY: CPT | Mod: 26,GC

## 2021-05-18 PROCEDURE — C1887: CPT

## 2021-05-18 PROCEDURE — 43265 ERCP LITHOTRIPSY CALCULI: CPT

## 2021-05-18 PROCEDURE — C9399: CPT

## 2021-05-18 PROCEDURE — 43265 ERCP LITHOTRIPSY CALCULI: CPT | Mod: GC

## 2021-05-18 PROCEDURE — 74330 X-RAY BILE/PANC ENDOSCOPY: CPT

## 2021-05-18 PROCEDURE — 43276 ERCP STENT EXCHANGE W/DILATE: CPT

## 2021-05-18 PROCEDURE — C1769: CPT

## 2021-05-18 PROCEDURE — 43276 ERCP STENT EXCHANGE W/DILATE: CPT | Mod: GC

## 2021-05-18 PROCEDURE — C2617: CPT

## 2021-05-18 RX ORDER — SODIUM CHLORIDE 9 MG/ML
500 INJECTION, SOLUTION INTRAVENOUS
Refills: 0 | Status: COMPLETED | OUTPATIENT
Start: 2021-05-18 | End: 2021-05-18

## 2021-05-18 RX ADMIN — SODIUM CHLORIDE 30 MILLILITER(S): 9 INJECTION, SOLUTION INTRAVENOUS at 08:23

## 2021-05-18 NOTE — ASU PATIENT PROFILE, ADULT - PMH
Calculus of gallbladder    History of palpitations    Hyperlipemia  not on meds  Hypertension    Insomnia    UTI (urinary tract infection)  recurrent

## 2021-05-18 NOTE — ASU PATIENT PROFILE, ADULT - PSH
S/P cholecystectomy  2012  S/P cystoscopy  TURBT X 1  S/P ERCP  2/2021  S/P hip replacement, right  2011

## 2021-06-25 ENCOUNTER — NON-APPOINTMENT (OUTPATIENT)
Age: 86
End: 2021-06-25

## 2021-08-06 ENCOUNTER — NON-APPOINTMENT (OUTPATIENT)
Age: 86
End: 2021-08-06

## 2021-08-12 PROBLEM — N39.0 URINARY TRACT INFECTION, SITE NOT SPECIFIED: Chronic | Status: ACTIVE | Noted: 2021-02-11

## 2021-08-24 ENCOUNTER — OUTPATIENT (OUTPATIENT)
Dept: OUTPATIENT SERVICES | Facility: HOSPITAL | Age: 86
LOS: 1 days | End: 2021-08-24
Payer: MEDICARE

## 2021-08-24 VITALS
RESPIRATION RATE: 16 BRPM | TEMPERATURE: 98 F | DIASTOLIC BLOOD PRESSURE: 71 MMHG | OXYGEN SATURATION: 95 % | WEIGHT: 141.98 LBS | HEART RATE: 68 BPM | HEIGHT: 64.96 IN | SYSTOLIC BLOOD PRESSURE: 109 MMHG

## 2021-08-24 DIAGNOSIS — K83.1 OBSTRUCTION OF BILE DUCT: ICD-10-CM

## 2021-08-24 DIAGNOSIS — Z46.89 ENCOUNTER FOR FITTING AND ADJUSTMENT OF OTHER SPECIFIED DEVICES: ICD-10-CM

## 2021-08-24 DIAGNOSIS — Z01.818 ENCOUNTER FOR OTHER PREPROCEDURAL EXAMINATION: ICD-10-CM

## 2021-08-24 DIAGNOSIS — Z98.890 OTHER SPECIFIED POSTPROCEDURAL STATES: Chronic | ICD-10-CM

## 2021-08-24 DIAGNOSIS — Z96.641 PRESENCE OF RIGHT ARTIFICIAL HIP JOINT: Chronic | ICD-10-CM

## 2021-08-24 DIAGNOSIS — Z87.898 PERSONAL HISTORY OF OTHER SPECIFIED CONDITIONS: ICD-10-CM

## 2021-08-24 DIAGNOSIS — Z90.49 ACQUIRED ABSENCE OF OTHER SPECIFIED PARTS OF DIGESTIVE TRACT: Chronic | ICD-10-CM

## 2021-08-24 LAB
ANION GAP SERPL CALC-SCNC: 14 MMOL/L — SIGNIFICANT CHANGE UP (ref 5–17)
BUN SERPL-MCNC: 16 MG/DL — SIGNIFICANT CHANGE UP (ref 7–23)
CALCIUM SERPL-MCNC: 9.2 MG/DL — SIGNIFICANT CHANGE UP (ref 8.4–10.5)
CHLORIDE SERPL-SCNC: 106 MMOL/L — SIGNIFICANT CHANGE UP (ref 96–108)
CO2 SERPL-SCNC: 22 MMOL/L — SIGNIFICANT CHANGE UP (ref 22–31)
CREAT SERPL-MCNC: 0.82 MG/DL — SIGNIFICANT CHANGE UP (ref 0.5–1.3)
GLUCOSE SERPL-MCNC: 93 MG/DL — SIGNIFICANT CHANGE UP (ref 70–99)
HCT VFR BLD CALC: 40.5 % — SIGNIFICANT CHANGE UP (ref 34.5–45)
HGB BLD-MCNC: 12.6 G/DL — SIGNIFICANT CHANGE UP (ref 11.5–15.5)
MCHC RBC-ENTMCNC: 29.9 PG — SIGNIFICANT CHANGE UP (ref 27–34)
MCHC RBC-ENTMCNC: 31.1 GM/DL — LOW (ref 32–36)
MCV RBC AUTO: 96 FL — SIGNIFICANT CHANGE UP (ref 80–100)
NRBC # BLD: 0 /100 WBCS — SIGNIFICANT CHANGE UP (ref 0–0)
PLATELET # BLD AUTO: 261 K/UL — SIGNIFICANT CHANGE UP (ref 150–400)
POTASSIUM SERPL-MCNC: 4.3 MMOL/L — SIGNIFICANT CHANGE UP (ref 3.5–5.3)
POTASSIUM SERPL-SCNC: 4.3 MMOL/L — SIGNIFICANT CHANGE UP (ref 3.5–5.3)
RBC # BLD: 4.22 M/UL — SIGNIFICANT CHANGE UP (ref 3.8–5.2)
RBC # FLD: 14.4 % — SIGNIFICANT CHANGE UP (ref 10.3–14.5)
SODIUM SERPL-SCNC: 142 MMOL/L — SIGNIFICANT CHANGE UP (ref 135–145)
WBC # BLD: 6.59 K/UL — SIGNIFICANT CHANGE UP (ref 3.8–10.5)
WBC # FLD AUTO: 6.59 K/UL — SIGNIFICANT CHANGE UP (ref 3.8–10.5)

## 2021-08-24 PROCEDURE — G0463: CPT

## 2021-08-24 PROCEDURE — 85027 COMPLETE CBC AUTOMATED: CPT

## 2021-08-24 PROCEDURE — 80048 BASIC METABOLIC PNL TOTAL CA: CPT

## 2021-08-24 RX ORDER — ALPRAZOLAM 0.25 MG
1 TABLET ORAL
Qty: 0 | Refills: 0 | DISCHARGE

## 2021-08-24 NOTE — H&P PST ADULT - PROBLEM SELECTOR PLAN 1
-scheduled ERCP on 9/7/21  -preop instructions given  Labs: CBC, BMP done in [facility]  -Obtain last medical notes from PCP w/ f/u on thyroid status

## 2021-08-24 NOTE — H&P PST ADULT - NSICDXPASTSURGICALHX_GEN_ALL_CORE_FT
PAST SURGICAL HISTORY:  S/P cholecystectomy 2012    S/P cystoscopy TURBT X 1    S/P ERCP 2/2021, 5/2021    S/P hip replacement, right 2011

## 2021-08-24 NOTE — H&P PST ADULT - NSICDXPASTMEDICALHX_GEN_ALL_CORE_FT
PAST MEDICAL HISTORY:  Calculus of gallbladder     History of palpitations     Hyperlipemia not on meds    Hypertension     Insomnia     Thyroid nodule 6/2021    UTI (urinary tract infection) recurrent

## 2021-08-24 NOTE — H&P PST ADULT - HISTORY OF PRESENT ILLNESS
87 year old female with h/o palpitations,  HTN, hyperlipidemia, B12 deficiency, bladder cancer (many years ago), frequent UTI , with dilated CBD - s/p  ERCP 2/16/2021 s/p needle knife sphincterotomy, mechanical lithotripsy, balloon extraction, and  double pigtail stent placed for continued drainage and stone fragmentation. Now coming in for ERCP on 5/18/2021.       Denies Recent travel, Exposure or Covid symptoms  Covid vaccine - 2 doses   Covid test - 5/15/2021 87 year old female with h/o palpitations,  HTN, hyperlipidemia, B12 deficiency, bladder cancer (many years ago), frequent UTI , with dilated CBD - s/p  ERCP 2/16/2021 & 5/18/21 s/p needle knife sphincterotomy, mechanical lithotripsy, balloon extraction, and  double pigtail stent placed for continued drainage and stone fragmentation. Now coming in for ERCP on 9/7/2021.     **Of note, pt was recently diagnosed (6/2021) with over active thyroid/thyroid nodule by PCP, placed on a medication she doesn't remember, f/u with Endo. Dr. Nair whom as per pt, stated the nodule is benign, but thyroid serum levels continue to be high. Pt was told to follow up with PCP before procedure.     Denies Recent travel, Exposure or Covid symptoms  Covid vaccine - 2 doses (Pfizer)   Covid test - 9/4/21

## 2021-09-03 DIAGNOSIS — Z01.818 ENCOUNTER FOR OTHER PREPROCEDURAL EXAMINATION: ICD-10-CM

## 2021-09-04 ENCOUNTER — APPOINTMENT (OUTPATIENT)
Dept: DISASTER EMERGENCY | Facility: CLINIC | Age: 86
End: 2021-09-04

## 2021-09-05 LAB — SARS-COV-2 N GENE NPH QL NAA+PROBE: NOT DETECTED

## 2021-09-06 RX ORDER — INDOMETHACIN 50 MG
100 CAPSULE ORAL ONCE
Refills: 0 | Status: DISCONTINUED | OUTPATIENT
Start: 2021-09-07 | End: 2021-09-21

## 2021-09-06 NOTE — PRE PROCEDURE NOTE - PRE PROCEDURE EVALUATION
Attending Physician:   Dr. Quintanilla                       Procedure:  ERCP     Indication for Procedure: Stone/stent removal   ________________________________________________________  PAST MEDICAL & SURGICAL HISTORY:  Hyperlipemia  not on meds    Insomnia    UTI (urinary tract infection)  recurrent    Hypertension    History of palpitations    Calculus of gallbladder    Thyroid nodule  6/2021    S/P hip replacement, right  2011    S/P cholecystectomy  2012    S/P ERCP  2/2021, 5/2021    S/P cystoscopy  TURBT X 1      ALLERGIES:  amoxicillin (Rash)    HOME MEDICATIONS:  metoprolol tartrate 25 mg oral tablet: 0.5 tablet orally twice a day   Vitamin C 1000 mg oral tablet: 1 tab(s) orally once a day    AICD/PPM: [ ] yes   [ ] no    PERTINENT LAB DATA:                      PHYSICAL EXAMINATION:    T(C): --  HR: --  BP: --  RR: --  SpO2: --    Constitutional: NAD  HEENT: PERRLA, EOMI,    Neck:  No JVD  Respiratory: CTAB/L  Cardiovascular: S1 and S2  Gastrointestinal: BS+, soft, NT/ND  Extremities: No peripheral edema  Neurological: A/O x 3, no focal deficits  Psychiatric: Normal mood, normal affect  Skin: No rashes    ASA Class: I [ ]  II [ ]  III [ ]  IV [ ]    COMMENTS:    The patient is a suitable candidate for the planned procedure unless box checked [ ]  No, explain:

## 2021-09-07 ENCOUNTER — OUTPATIENT (OUTPATIENT)
Dept: OUTPATIENT SERVICES | Facility: HOSPITAL | Age: 86
LOS: 1 days | End: 2021-09-07
Payer: MEDICARE

## 2021-09-07 ENCOUNTER — APPOINTMENT (OUTPATIENT)
Dept: GASTROENTEROLOGY | Facility: HOSPITAL | Age: 86
End: 2021-09-07

## 2021-09-07 VITALS
RESPIRATION RATE: 20 BRPM | HEART RATE: 70 BPM | SYSTOLIC BLOOD PRESSURE: 139 MMHG | OXYGEN SATURATION: 99 % | DIASTOLIC BLOOD PRESSURE: 80 MMHG

## 2021-09-07 VITALS
TEMPERATURE: 98 F | OXYGEN SATURATION: 98 % | RESPIRATION RATE: 18 BRPM | HEART RATE: 76 BPM | SYSTOLIC BLOOD PRESSURE: 140 MMHG | DIASTOLIC BLOOD PRESSURE: 75 MMHG | WEIGHT: 145.06 LBS | HEIGHT: 65 IN

## 2021-09-07 DIAGNOSIS — Z96.641 PRESENCE OF RIGHT ARTIFICIAL HIP JOINT: Chronic | ICD-10-CM

## 2021-09-07 DIAGNOSIS — Z98.890 OTHER SPECIFIED POSTPROCEDURAL STATES: Chronic | ICD-10-CM

## 2021-09-07 DIAGNOSIS — Z90.49 ACQUIRED ABSENCE OF OTHER SPECIFIED PARTS OF DIGESTIVE TRACT: Chronic | ICD-10-CM

## 2021-09-07 DIAGNOSIS — Z46.89 ENCOUNTER FOR FITTING AND ADJUSTMENT OF OTHER SPECIFIED DEVICES: ICD-10-CM

## 2021-09-07 PROCEDURE — 43265 ERCP LITHOTRIPSY CALCULI: CPT

## 2021-09-07 PROCEDURE — 74330 X-RAY BILE/PANC ENDOSCOPY: CPT

## 2021-09-07 PROCEDURE — 43275 ERCP REMOVE FORGN BODY DUCT: CPT

## 2021-09-07 PROCEDURE — 43275 ERCP REMOVE FORGN BODY DUCT: CPT | Mod: GC

## 2021-09-07 PROCEDURE — 43277 ERCP EA DUCT/AMPULLA DILATE: CPT | Mod: 59,GC

## 2021-09-07 PROCEDURE — 43265 ERCP LITHOTRIPSY CALCULI: CPT | Mod: GC

## 2021-09-07 PROCEDURE — 74330 X-RAY BILE/PANC ENDOSCOPY: CPT | Mod: 26,GC

## 2021-09-07 PROCEDURE — C9399: CPT

## 2021-09-07 PROCEDURE — C1726: CPT

## 2021-09-07 PROCEDURE — C1769: CPT

## 2021-09-07 RX ORDER — METHIMAZOLE 10 MG/1
1 TABLET ORAL
Qty: 0 | Refills: 0 | DISCHARGE

## 2021-09-07 RX ORDER — ASCORBIC ACID 60 MG
1 TABLET,CHEWABLE ORAL
Qty: 0 | Refills: 0 | DISCHARGE

## 2021-09-07 RX ORDER — FAMOTIDINE 10 MG/ML
1 INJECTION INTRAVENOUS
Qty: 0 | Refills: 0 | DISCHARGE

## 2021-09-07 RX ORDER — METOPROLOL TARTRATE 50 MG
1 TABLET ORAL
Qty: 0 | Refills: 0 | DISCHARGE

## 2021-09-07 RX ORDER — SODIUM CHLORIDE 9 MG/ML
500 INJECTION, SOLUTION INTRAVENOUS
Refills: 0 | Status: COMPLETED | OUTPATIENT
Start: 2021-09-07 | End: 2021-09-07

## 2021-09-07 RX ADMIN — SODIUM CHLORIDE 30 MILLILITER(S): 9 INJECTION, SOLUTION INTRAVENOUS at 09:30

## 2021-09-07 NOTE — PRE-ANESTHESIA EVALUATION ADULT - WEIGHT IN LBS
After Visit Summary   2017    Adan Ruffin    MRN: [id number]           Patient Information     Date Of Birth          1952        Visit Information        Provider Department      2017 11:00 AM Marilee Tolentino MD [facility]        Today's Diagnoses     Type 2 diabetes mellitus with diabetic nephropathy, with long-term current use of insulin (H)    -  1    Insulin dependent diabetes mellitus (H)        Hyperlipidemia, unspecified hyperlipidemia type        Coronary artery disease involving native coronary artery of native heart without angina pectoris        S/P CABG x 3        Essential hypertension        Microalbuminuria          Care Instructions    [facility] [place] locations   Dr Tolentino, Endocrinology Department      [facility]   [address]  [place] [address]  Appointment [phone]  Fax: [phone]  [place]: Monday and Tuesday         [place] [address]  [place] [address]  Appointment [phone]  Fax: [phone]  [place]: Wednesday and Thursday          Please arrive 30 minutes before your scheduled appointment.   First go to the main floor lab suit  for previsit A1c lab appointment and then come upstairs and get checked in with  for clinic visit.  This will allow for your blood glucose meter/insulin pump to be downloaded and glycosylated hemoglobin (A1c) to be obtained prior to your appointment.    Increase Toujeo to 90 units in AM and continue 70 Units at night  Continue Novolog at current dose. 1 Units/ 3 gm of Carbs + 1: 15 > 140 with each meal  Your provider has referred you to Diabetes Education: For all [facility]:  Phone [phone]; Fax [phone]  Please call and make the appointment.--> transition to U-500  Check with insurance if U-500 insulin is covered and also if diabetic  educator visit for U-500 initiation is covered.  Labs and follow up in 3 months.    Recommend checking blood sugars before meals and at bedtime.    If Blood glucose are low more often-> 2-3 times/week- give us a call.  The patient is advised to Make better food choices: reduce carbs, Reduce portion size, weight loss and exercise 3-4 times a week.  Discussed hypoglycemia signs and symptoms as well as management in detail.                          Follow-ups after your visit        Additional Services     DIABETES EDUCATOR REFERRAL       Your provider has referred you to Diabetes Education: For all [facility]:  Phone [phone]; Fax [phone]    This is a Previous Diagnosis     Type of diabetes is Type 2 - On Insulin                                                          A1C is: Lab Results       Component                Value               Date                       A1C                      8.3                 10/02/2017            If an urgent visit is needed or A1C is above 12, Care Team to call the diabetes education team at [phone] or send a message to the diabetes education pool (P DIAB ED-PATIENT CARE).    Diabetes education focus: transition to U-500      Education needs: None                                                                                                                                                      Please be aware that coverage of these services is subject to the terms and limitations of your health insurance plan.  Call member services at your health plan to determine Diabetes Self-Management Training benefits and ask which blood glucose monitor brands are covered by your plan.      Please bring the following to your appointment:    -   List of current medications   -   List of blood glucose monitor brands that are covered by your insurance plan  -   Blood glucose monitor and log book  -   Food records for the 3 days prior to your visit      The Certified Diabetes  Educator may make diabetes medication adjustments per  the CDE Protocol and Collaborative Practice Agreement.                  Your next 10 appointments already scheduled     Dec 20, 2017 11:00 AM CST   Ech Complete with [id number]   [facility] ([facility])    [address]  [facility] [id number] [phone]           1. Please bring or wear a comfortable two-piece outfit. 2. You may eat, drink and take your normal medicines. 3. For any questions that cannot be answered, please contact the ordering physician ***Please check-in at the [place] Registration Office located in [address] in the [facility] building. When you are finished registering, please go to [address] and have a seat. The technician will call your name for the test.            Dec 27, 2017  1:15 PM CST   P EP RETURN with Ramy Laguna MD   [facility]    [facility] [address]  [facility] [id number] [phone]              Who to contact     If you have questions or need follow up information about today's clinic visit or your schedule please contact [facility] directly at [phone].  Normal or non-critical lab and imaging results will be communicated to you by MyChart, letter or phone within 4 business days after the clinic has received the results. If you do not hear from us within 7 days, please contact the clinic through Splysthart or phone. If you have a critical or abnormal lab result, we will notify you by phone as soon as possible.  Submit refill requests through Pixowl or call your pharmacy and they will forward the refill request to us. Please allow 3 business days for your refill to be completed.          Additional Information About Your Visit        Pixowl Information     Pixowl gives you secure access to your electronic health record. If you see a primary care provider,  "you can also send messages to your care team and make appointments. If you have questions, please call your primary care clinic.  If you do not have a primary care provider, please call [phone] and they will assist you.        Care EveryWhere ID     This is your Care EveryWhere ID. This could be used by other organizations to access your [facility] medical records  [id number]        Your Vitals Were     Pulse Temperature Height Pulse Oximetry BMI (Body Mass Index)       73 97.6  F (36.4  C) (Oral) 1.753 m (5' 9\") 94% 35.72 kg/m2        Blood Pressure from Last 3 Encounters:   12/06/17 118/62   11/27/17 123/76   10/30/17 100/60    Weight from Last 3 Encounters:   12/06/17 109.7 kg (241 lb 14.4 oz)   11/27/17 106.5 kg (234 lb 12.8 oz)   10/30/17 106.6 kg (235 lb)              We Performed the Following     DIABETES EDUCATOR REFERRAL     FOOT EXAM          Today's Medication Changes          These changes are accurate as of: 12/6/17 11:29 AM.  If you have any questions, ask your nurse or doctor.               These medicines have changed or have updated prescriptions.        Dose/Directions    insulin aspart 100 UNIT/ML injection   Commonly known as:  NovoLOG FLEXPEN   This may have changed:  additional instructions   Used for:  Type 2 diabetes mellitus with diabetic nephropathy, with long-term current use of insulin (H)   Changed by:  Marilee Tolentino MD        Take 1 unit for every 3 grams of carbohydrate PLUS 1 unit for every 15 mg/dL you are over 140mg/dL  units   Quantity:  45 mL   Refills:  3       insulin glargine U-300 300 UNIT/ML injection   Commonly known as:  TOUJEO   This may have changed:  additional instructions   Used for:  Type 2 diabetes mellitus with diabetic nephropathy, with long-term current use of insulin (H)   Changed by:  Marilee Tolentino MD        Inject 90 units in am, inject 70 units in pm   Quantity:  135 mL   Refills:  1            Where to get your medicines      Some " of these will need a paper prescription and others can be bought over the counter.  Ask your nurse if you have questions.     You don't need a prescription for these medications     insulin aspart 100 UNIT/ML injection    insulin glargine U-300 300 UNIT/ML injection                Primary Care Provider Office Phone # Fax #    Lauro Galloway [phone]       [address] [facility] [address]        Equal Access to Services     DANIEL FAM : Hadii aad ku hadasho Soomaali, waaxda luqadaha, qaybta kaalmada adeegyada, waxay idiin hayaan adeeg kharash layaron . So [facility] [phone].    ATENCIÓN: Si habla espgloria, tiene a moore disposición servicios gratuitos de asistencia lingüística. Llame al [phone].    We comply with applicable federal civil rights laws and [place] laws. We do not discriminate on the basis of race, color, national origin, age, disability, sex, sexual orientation, or gender identity.            Thank you!     Thank you for choosing [facility]  for your care. Our goal is always to provide you with excellent care. Hearing back from our patients is one way we can continue to improve our services. Please take a few minutes to complete the written survey that you may receive in the mail after your visit with us. Thank you!             Your Updated Medication List - Protect others around you: Learn how to safely use, store and throw away your medicines at www.disposemymeds.org.          This list is accurate as of: 12/6/17 11:29 AM.  Always use your most recent med list.                   Brand Name Dispense Instructions for use Diagnosis    ACE NOT PRESCRIBED (INTENTIONAL)     0 each    1 each daily ACE Inhibitor not prescribed due to Intolerance    Type II or unspecified type diabetes mellitus without mention of complication, not stated as uncontrolled       apixaban ANTICOAGULANT 5 MG tablet    ELIQUIS    120 tablet    Take 1 tablet (5 mg) by mouth 2 times  daily    Cardiomyopathy, unspecified type (H), Typical atrial flutter (H)       aspirin EC 81 MG EC tablet     1 tablet    Take 1 tablet (81 mg) by mouth daily    CAD (coronary artery disease), S/P CABG x 3, Anxiety       atorvastatin 40 MG tablet    LIPITOR    90 tablet    TAKE 1 TABLET BY MOUTH DAILY    Hyperlipidemia LDL goal <70       blood glucose monitoring lancets     200 each    Use to test blood sugar 4 times daily or as directed.    Type 2 diabetes mellitus with diabetic nephropathy, with long-term current use of insulin (H)       blood glucose monitoring test strip    no brand specified    6 Box    Use to test blood sugars 4 times daily or as directed    Type 2 diabetes mellitus with diabetic nephropathy, with long-term current use of insulin (H)       CENTRUM SILVER per tablet      Take 1 tablet by mouth daily        furosemide 20 MG tablet    LASIX    90 tablet    TAKE 1 TABLET(20 MG) BY MOUTH DAILY    Essential hypertension       glipiZIDE 10 MG 24 hr tablet    GLUCOTROL XL    30 tablet    TAKE 1 TABLET BY MOUTH EVERY DAY    Type 2 diabetes mellitus with diabetic nephropathy, with long-term current use of insulin (H)       insulin aspart 100 UNIT/ML injection    NovoLOG FLEXPEN    45 mL    Take 1 unit for every 3 grams of carbohydrate PLUS 1 unit for every 15 mg/dL you are over 140mg/dL  units    Type 2 diabetes mellitus with diabetic nephropathy, with long-term current use of insulin (H)       insulin glargine U-300 300 UNIT/ML injection    TOUJEO    135 mL    Inject 90 units in am, inject 70 units in pm    Type 2 diabetes mellitus with diabetic nephropathy, with long-term current use of insulin (H)       insulin pen needle 30G X 8 MM    NOVOFINE    300 each    Use 4-5 daily or as directed.    Type 2 diabetes mellitus with diabetic nephropathy, with long-term current use of insulin (H)       metFORMIN 1000 MG tablet    GLUCOPHAGE    180 tablet    TAKE 1 TABLET BY MOUTH TWICE DAILY WITH MEALS     Insulin dependent diabetes mellitus (H)       metoprolol 100 MG 24 hr tablet    TOPROL XL    60 tablet    Take 1 tablet (100 mg) by mouth 2 times daily    Typical atrial flutter (H)       * order for DME     3 Month    All DM testing supplies including test strips, lancets, solution, syringes, needles and/or pen needles for testing 4 times per day & injecting 5 times per day. Dispense glucometer compatible supplies. Accucheck Geena.    Type 2 diabetes mellitus with diabetic nephropathy (H)       * order for DME     1 Box    All DM testing supplies including test strips, lancets, solution, syringes, needles and/or pen needles for testing 3 times per day & injecting 3 times per day.   RX goes to  Network Supplier, see tele enc 09/21/2016    Type 2 diabetes mellitus with diabetic nephropathy (H)       order for DME     1 each    Equipment being ordered: blood glucose meter    Type 2 diabetes mellitus with diabetic nephropathy, with long-term current use of insulin (H)       sertraline 100 MG tablet    ZOLOFT    90 tablet    Take 1 tablet (100 mg) by mouth daily    Anxiety       vitamin B complex with vitamin C Tabs tablet      Take 2 tablets by mouth daily        VITAMIN C PO      Take by mouth daily        * Notice:  This list has 2 medication(s) that are the same as other medications prescribed for you. Read the directions carefully, and ask your doctor or other care provider to review them with you.       145

## 2022-01-26 NOTE — PRE-ANESTHESIA EVALUATION ADULT - HEIGHT IN CM
[address] [facility]  Occupational Therapy  Daily Note  Time:   Time In: 7269  Time Out: 1039  Timed Code Treatment Minutes: 27 Minutes  Minutes: 27          Date: 2022  Patient Name: Lincoln Graves,   Gender: male      Room: [facility]/028-A  MRN: [id number]  : 1955  (77 y.o.)  Referring Practitioner: Adam Pereira MD  Diagnosis: Sepsis  Additional Pertinent Hx: Lincoln Graves is a 77 y.o. male who presents to [facility] ED 2022 with buttock pain. Patient is a lifetime nonsmoker with a PMH significant for recent LLE DVT on Eliquis, IDDM, Primary hypertension, urinary retention. Patient arrived by EMS from [facility] with new decubitus ulcer. Patient believes this was present for multiple weeks however this was not well documented on his previous hospital encounters. He reports he had the area drained approximately one week ago by wound team.  Patient reports worsening pain accompanied by fever sweats and chills over the last week. Of note patient was seen in the ED on 2022 and ultrasound of the left leg demonstrated acute DVT affecting the peroneal and anterior tibial veins. Initiated on therapy for suspected DVT of left lower extremity with Eliquis 5 mg daily and has been on anticoagulation with Eliquis since that time. Prior to this patient was hospitalized from 2021 to 2021 for COVID-19 pneumonia. Patient received the usual therapy of Decadron and baricitinib and was discharged to a skilled nursing facility at [facility] for rehab on room air. sx  EXPLORATORY LAPAROSCOPY, DIVERTING COLOSTOMY    Restrictions/Precautions:  Restrictions/Precautions: General Precautions,Fall Risk  Position Activity Restriction  Other position/activity restrictions: colostomy, buttock wound     SUBJECTIVE: Pt laying on side upon arrival, pt agreeable to OT session, RN gave verbal approval for session.  Pt very emotional when it comes to his buttocks, and the wound and what happened at the SNF. PAIN: 6/10: buttocks    Vitals: Nurse checked vitals prior to session    COGNITION: WFL    ADL:   No ADL's completed this session. Nubia Ratliff BALANCE:  Sitting Balance:  Stand By Assistance. with pt sitting for 9 minutes with pt able to have 1 UE release from the EOB with no c/o pain    BED MOBILITY:  Rolling to Left: Stand By Assistance with pt using railing  Rolling to Right: Stand By Assistance with pt using railing  Supine to Sit: Moderate Assistance with HOB elevated  Sit to Supine: Minimal Assistance for bringing BLE back into bed with pt positioned on backside, with pillow placed under Rt hip     ASSESSMENT:     Activity Tolerance:  Patient tolerance of  treatment: good. Discharge Recommendations: Inpatient Rehabilitation  Equipment Recommendations: Equipment Needed:  (continue to assess)  Plan: Times per week: 5x  Times per day: Daily  Current Treatment Recommendations: Strengthening,Safety Education & Training,Patient/Caregiver Education & Training,Self-Care / ADL,Functional Mobility Training,Endurance Training    Patient Education  Patient Education: ADL's    Goals  Short term goals  Time Frame for Short term goals: by discharge  Short term goal 1: pt will tolerate sitting EOB with min A x1 for 5 minutes to increase indep with completing ADLs  Short term goal 2: pt will complete UB ADL with set up only to increase indep with grooming and dressing tasks. Short term goal 3: pt will complete BUE mod resisitve exercises with min cuing for technique to increase strength and endurance required for ADLs  Short term goal 4: OTR to assess functional transfers and functional mobility when appropriate    Following session, patient left in safe position with all fall risk precautions in place. 165.1

## 2024-11-19 NOTE — ED ADULT NURSE NOTE - PAIN RATING/NUMBER SCALE (0-10): ACTIVITY
Physical Therapy    Visit Type: treatment  SUBJECTIVE  Patient verbally agrees to allow the following to be present during session: daughter  Pt resting in bed upon arrival. Pt reports she's motivated to dc from the hospital today to return home with her daughter and stay at her daughter's house while she continues to recover. Daughter reports pt being unfamiliar with the non mechanical sit to stand and requesting to trial pt's familiar standing pivot/squat pivot to determine if pt will be able to complete pivot to safety complete a car transfers and dc today. Daughter has to return home to [place] today but the pt does not want to remain in the hospital since family has to return home.   Patient / Family Goal: return to previous functional status    Pain   Patient denies pain.    At onset of session (out of 10): 0     OBJECTIVE     Cognitive Status   Arousal Alertness   - appropriate responses to stimuli  Affect/Behavior    - pleasant and cooperative  Orientation    - Oriented to: person and place  Following Direction   - follows one step commands consistently    Patient Activity Tolerance: 1 to 1 activity to rest       Sitting Balance  (DUSTIN = base of support)  Static      - Trial 1 details: with back unsupported and supervision  Dynamic      - Trial 1 details: with single UE support, contact guard and with back unsupported    Standing Balance  (DUSTIN = base of support)  Firm Surface: Double Leg      - Static, Eyes Open       - Trial 1 details: moderate assist and with double UE support       Bed Mobility  - Supine to sit: minimal assist, with tactile cues, with verbal cues (HOB elevated and frim bed rail use)  Pt educated on bed mobility sequencing with assist for trunk and LE. Pt with much improved trunk and LE strength allowing increase ease and ability to anteriorly position hips towards the edge of the bed.   Transfers  Assistive devices: gait belt, hand hold  - Stand pivot: with tactile cues, with verbal cues  (Mod/Max A of 1 with second present for safety)  Pt educated on transfer sequencing with Mod/Max A of 1 with second present for safety for for anterior weight shift over DUSTIN and hip/knee extension towards neutral and safety. Daughter assisting pt on left side with chair positioned on right with pt able to slowly advance feet a few shuffled steps (taking 2-3 sitting rest breaks between trials) with holding onto armrests of the chair. Writer closely present and initial blocking LE as precaution with no true knee buckling noted. Quick fatigue and multiple sitting rest breaks required to safely complete entire pivot transfer.     Additional time educating and collaborating with pt and daughter at length to 1. Maximize car transfer safety vs 2. Overall best dc planning. Pt and daughter to call another family member in [place] who may be able to provide additional physical support and assistance. Time collaborating with RN.         Interventions     Supine    Lower Extremity: Bilateral: SLR, heel slides, ankle pumps, hip abduction and hip adduction, AAROM, 15 reps, 1 sets  Training provided: activity tolerance, balance retraining, bed mobility training, transfer training, safety training, use of assistive device and positioning    Skilled input: Verbal instruction/cues, tactile instruction/cues and posture correction  Verbal Consent: Writer verbally educated and received verbal consent for hand placement, positioning of patient, and techniques to be performed today from patient for hand placement and palpation for techniques, therapist position for techniques and clothing adjustments for techniques as described above and how they are pertinent to the patient's plan of care.         Education:   - Present and ready to learn: patient  Education provided during session:  - Please refer to the mobility and intervention note sections for education provided during the session.   - Results of above outlined education: Verbalizes  understanding and Needs reinforcement    ASSESSMENT   Progress: progressing toward goals  Interfering components: decreased activity tolerance and medical status limitations    Discharge needs based on today's assessment:   - Current level of function: slightly below baseline level of function   - Therapy needs at discharge: therapy 5 or more times per week (DUSTY vs home and home therapy with 24 hour family assistance pending on progress)   - Activities of daily living (ADLs) requiring support at discharge: bed mobility, transfers, ambulation and stairs   - Impairments that require further therapy intervention: activity tolerance, balance, safety awareness, strength, skin integrity and ROM    AM-PAC  - Generalized Prior Level of Function: Needs a little help ([facility] 12-21)       Key: MOD A=moderate assistance, IND/MOD I=independent/modified independent  - Generalized Current Level of Function     - Current Mobility Score: 11       AM-PAC Scoring Key= >21 Modified Independent; 20-21 Supervision; 18-19 Minimal assist; 13-18 Moderate assist; 9-12 Max assist; <9 Total assist      PLAN (while hospitalized)  Suggestions for next session as indicated: LE strengthening, bed mobility, transfers (squat vs full standing pivot transfers vs slide board transfers to ).    PT Frequency: 3-5 x per week      PT/OT Mobility Equipment for Discharge: owns   PT/OT ADL Equipment for Discharge: TBD  Agreement to plan and goals: patient agrees with goals and treatment plan        GOALS  Review Date: 11/20/2024  Long Term Goals: (to be met by time of discharge from hospital)  Sit to supine: Patient will complete sit to supine minimal assist.  Supine to sit: Patient will complete supine to sit minimal assist.  Sit to stand: Patient will complete sit to stand transfer with least restrictive device, minimal assist.   Stand to sit: Patient will complete stand to sit transfer with least restrictive device, minimal assist.   Stand pivot:  Patient will complete stand pivot transfer with least restrictive device, moderate assist.   Documented in the chart in the following areas: Assessment/Plan.      Patient at End of Session:   Location: in chair  Safety measures: alarm system in place/re-engaged, call light within reach, equipment intact and lines intact  Handoff to: nurse      Therapy procedure time and total treatment time can be found documented on the Time Entry flowsheet   0

## 2025-05-05 NOTE — ASU DISCHARGE PLAN (ADULT/PEDIATRIC) - NSDISCH_ACTIVITY_ENDO_ALL_CORE_FT
No As you may have been given sedative drugs and medications, you should not drive or operate heavy machinery the next 24 hours. You should avoid any heavy lifting, straining or running today. To the extent possible, defer any important decisions for the next 24 hours.

## 2025-05-24 NOTE — H&P PST ADULT - DENTITION
Pt has been sick for a week with a cough and congestion   Last night pt started being SOB   Pt denies having asthma   Pt denies pain   Pt is afebrile   Pt is A&Ox4     top and bottom denture